# Patient Record
Sex: FEMALE | Race: WHITE | Employment: FULL TIME | URBAN - METROPOLITAN AREA
[De-identification: names, ages, dates, MRNs, and addresses within clinical notes are randomized per-mention and may not be internally consistent; named-entity substitution may affect disease eponyms.]

---

## 2022-02-22 NOTE — PROGRESS NOTES
Gaston Torres    Age 49 y.o.    female    1970    MRN [id number]    3/10/2022  Arrival Time_____________  OR Time____________[address]     Procedure(s):  DILATATION AND CURETTAGE, VIDEO HYSTEROSCOPY,ENDOMETRIAL POLYPECTOMY, NOVASURE ENDOMETRIAL ABLATION                      General     Surgeon(s):  Sharon Fam, MD      DAY ADMIT ___  SDS/OP ___  OUTPT IN BED ___         Phone [phone] ([facility])    PCP _____________________ Phone_________________ Epic ( ) Epic CE ( ) Appt ________    ADDITIONAL INFO __________________________________ Cardio/Consult _____________    NOTES _____________________________________________________________________    ____________________________________________________________________________    PAT APPT DATE:________ TIME: ________  FAXED QAD: _______  (__) H&P w/ hospitalist  ____________________________________________________________________________    COVID TEST: Date/Location______________        NURSING HISTORY COMPLETE: _______  (__) CBC       (__) W/ DIFF ___________  (__)  ECHO    __________  (__) Hgb A1C    ___________  (__) CHEST X RAY   __________  (__) LIPID PROFILE  ___________  (__) EKG   __________  (__) PT/PTT   ___________  (__) PFT's   __________  (__) BMP   ___________  (__) CAROTIDS  __________  (__) CMP   ___________  (__) VEIN MAPPING  __________  (__) U/A   ___________  (__) HISTORY & PHYSICAL __________  (__) URINE C & S  ___________  (__) CARDIAC CLEARANCE __________  (__) U/A W/ FLEX  ___________  (__) PULM.  CLEARANCE __________  (__) SERUM PREGNANCY ___________  (__) Check Epic DOS orders __________  (__) TYPE & SCREEN ________ repeat ( ) (__)  __________________ __________  (__) ALBUMIN   ___________  (__)  __________________ __________  (__) TRANSFERRIN  ___________  (__)  __________________ __________  (__) LIVER PROFILE  ___________  (__)  __________________ __________  (__) CARBOXY HGB  ___________  (__) URINE PREG DOS __________  (__) NICOTINE & MET.  ___________  (__) BLOOD SUGAR DOS __________  (__) PREALBUMIN  ___________    (__) MRSA NASAL SWAB ___________  (__) BLOOD THINNERS __________  (__) ACE/ ARBS: _____________________    (__) BETABLOCKERS ___________________

## 2022-03-02 ENCOUNTER — ANESTHESIA EVENT (OUTPATIENT)
Dept: OPERATING ROOM | Age: 52
End: 2022-03-02
Payer: COMMERCIAL

## 2022-03-04 RX ORDER — IBUPROFEN 200 MG
200 TABLET ORAL EVERY 6 HOURS PRN
Status: ON HOLD | COMMUNITY
End: 2022-03-10 | Stop reason: HOSPADM

## 2022-03-04 NOTE — PROGRESS NOTES
Obstructive Sleep Apnea (SUSSY) Screening     Patient:  Kristine Ochoa    YOB: 1970      Medical Record #:  [id number]                     Date:  3/4/2022     1. Are you a loud and/or regular snorer? []  Yes       [x] No    2. Have you been observed to gasp or stop breathing during sleep? []  Yes       [x] No    3. Do you feel tired or groggy upon awakening or do you awaken with a headache?           []  Yes       [x] No    4. Are you often tired or fatigued during the wake time hours? []  Yes       [x] No    5. Do you fall asleep sitting, reading, watching TV or driving? []  Yes       [x] No    6. Do you often have problems with memory or concentration? []  Yes       [x] No    If patient's SUSSY score if greater than or equal to 3, they are considered high risk for SUSSY. An anesthesia provider will evaluate the patient and develop a plan of care the day of surgery. Note:  If the patient's BMI is more than 35 kg m¯² , has neck circumference > 40 cm, and/or high blood pressure the risk is greater (© American [employer]nea Association, 2006).

## 2022-03-04 NOTE — PROGRESS NOTES
they will be removed, please bring a case for them. 10. If appicable,Please see your family doctor/pediatrician for a history & physical and/or concerning medications. Bring any test results/reports from your physician's office. 11. Remember to bring Blood Bank bracelet to the hospital on the day of surgery. 12. If you have a Living Will and Durable Power of  for Healthcare, please bring in a copy. 15. Notify your Surgeon if you develop any illness between now and surgery  time, cough, cold, fever, sore throat, nausea, vomiting, etc.  Please notify your surgeon if you experience dizziness, shortness of breath or blurred vision between now & the time of your surgery   14. DO NOT shave your operative site 96 hours prior to surgery. For face & neck surgery, men may use an electric razor 48 hours prior to surgery. 15. Shower the night before surgery with ___Antibacterial soap ___Hibiclens   16. To provide excellent care visitors will be limited to one in the room at any given time. 17.  Please bring picture ID and insurance card. 18.  Visit our web site for additional information:  Mobile Messenger. [facility]/surgery.

## 2022-03-10 ENCOUNTER — ANESTHESIA (OUTPATIENT)
Dept: OPERATING ROOM | Age: 52
End: 2022-03-10
Payer: COMMERCIAL

## 2022-03-10 ENCOUNTER — HOSPITAL ENCOUNTER (OUTPATIENT)
Age: 52
Setting detail: OUTPATIENT SURGERY
Discharge: HOME | End: 2022-03-10
Attending: OBSTETRICS & GYNECOLOGY | Admitting: OBSTETRICS & GYNECOLOGY
Payer: COMMERCIAL

## 2022-03-10 VITALS
OXYGEN SATURATION: 100 % | DIASTOLIC BLOOD PRESSURE: 80 MMHG | TEMPERATURE: 95 F | RESPIRATION RATE: 1 BRPM | SYSTOLIC BLOOD PRESSURE: 120 MMHG

## 2022-03-10 VITALS
SYSTOLIC BLOOD PRESSURE: 143 MMHG | RESPIRATION RATE: 14 BRPM | BODY MASS INDEX: 25.4 KG/M2 | HEIGHT: 62 IN | HEART RATE: 77 BPM | OXYGEN SATURATION: 99 % | WEIGHT: 138 LBS | DIASTOLIC BLOOD PRESSURE: 78 MMHG | TEMPERATURE: 96.2 F

## 2022-03-10 DIAGNOSIS — N93.8 DYSFUNCTIONAL UTERINE BLEEDING: ICD-10-CM

## 2022-03-10 DIAGNOSIS — N93.9 ABNORMAL UTERINE BLEEDING: Primary | ICD-10-CM

## 2022-03-10 LAB
ABO/RH: NORMAL
ANTIBODY SCREEN: NORMAL
HCT VFR BLD CALC: 38.3 % (ref 36–48)
HEMOGLOBIN: 12.6 G/DL (ref 12–16)
MCH RBC QN AUTO: 29.4 PG (ref 26–34)
MCHC RBC AUTO-ENTMCNC: 32.9 G/DL (ref 31–36)
MCV RBC AUTO: 89.2 FL (ref 80–100)
PDW BLD-RTO: 13.6 % (ref 12.4–15.4)
PLATELET # BLD: 253 K/UL (ref 135–450)
PMV BLD AUTO: 8.5 FL (ref 5–10.5)
PREGNANCY, URINE: NEGATIVE
RBC # BLD: 4.29 M/UL (ref 4–5.2)
WBC # BLD: 5.1 K/UL (ref 4–11)

## 2022-03-10 PROCEDURE — 7100000001 HC PACU RECOVERY - ADDTL 15 MIN: Performed by: OBSTETRICS & GYNECOLOGY

## 2022-03-10 PROCEDURE — 85027 COMPLETE CBC AUTOMATED: CPT

## 2022-03-10 PROCEDURE — 3600000004 HC SURGERY LEVEL 4 BASE: Performed by: OBSTETRICS & GYNECOLOGY

## 2022-03-10 PROCEDURE — 6360000002 HC RX W HCPCS: Performed by: NURSE ANESTHETIST, CERTIFIED REGISTERED

## 2022-03-10 PROCEDURE — 2709999900 HC NON-CHARGEABLE SUPPLY: Performed by: OBSTETRICS & GYNECOLOGY

## 2022-03-10 PROCEDURE — 36415 COLL VENOUS BLD VENIPUNCTURE: CPT

## 2022-03-10 PROCEDURE — 3700000001 HC ADD 15 MINUTES (ANESTHESIA): Performed by: OBSTETRICS & GYNECOLOGY

## 2022-03-10 PROCEDURE — 3700000000 HC ANESTHESIA ATTENDED CARE: Performed by: OBSTETRICS & GYNECOLOGY

## 2022-03-10 PROCEDURE — 88305 TISSUE EXAM BY PATHOLOGIST: CPT

## 2022-03-10 PROCEDURE — 6360000002 HC RX W HCPCS: Performed by: ANESTHESIOLOGY

## 2022-03-10 PROCEDURE — 86900 BLOOD TYPING SEROLOGIC ABO: CPT

## 2022-03-10 PROCEDURE — 84703 CHORIONIC GONADOTROPIN ASSAY: CPT

## 2022-03-10 PROCEDURE — 7100000010 HC PHASE II RECOVERY - FIRST 15 MIN: Performed by: OBSTETRICS & GYNECOLOGY

## 2022-03-10 PROCEDURE — 2580000003 HC RX 258: Performed by: OBSTETRICS & GYNECOLOGY

## 2022-03-10 PROCEDURE — 3600000014 HC SURGERY LEVEL 4 ADDTL 15MIN: Performed by: OBSTETRICS & GYNECOLOGY

## 2022-03-10 PROCEDURE — 2500000003 HC RX 250 WO HCPCS: Performed by: NURSE ANESTHETIST, CERTIFIED REGISTERED

## 2022-03-10 PROCEDURE — 7100000000 HC PACU RECOVERY - FIRST 15 MIN: Performed by: OBSTETRICS & GYNECOLOGY

## 2022-03-10 PROCEDURE — 86901 BLOOD TYPING SEROLOGIC RH(D): CPT

## 2022-03-10 PROCEDURE — 2580000003 HC RX 258: Performed by: NURSE ANESTHETIST, CERTIFIED REGISTERED

## 2022-03-10 PROCEDURE — 7100000011 HC PHASE II RECOVERY - ADDTL 15 MIN: Performed by: OBSTETRICS & GYNECOLOGY

## 2022-03-10 PROCEDURE — 86850 RBC ANTIBODY SCREEN: CPT

## 2022-03-10 PROCEDURE — 2500000003 HC RX 250 WO HCPCS: Performed by: ANESTHESIOLOGY

## 2022-03-10 PROCEDURE — 6370000000 HC RX 637 (ALT 250 FOR IP): Performed by: ANESTHESIOLOGY

## 2022-03-10 RX ORDER — SODIUM CHLORIDE, SODIUM LACTATE, POTASSIUM CHLORIDE, CALCIUM CHLORIDE 600; 310; 30; 20 MG/100ML; MG/100ML; MG/100ML; MG/100ML
INJECTION, SOLUTION INTRAVENOUS CONTINUOUS PRN
Status: COMPLETED | OUTPATIENT
Start: 2022-03-10 | End: 2022-03-10

## 2022-03-10 RX ORDER — SODIUM CHLORIDE 0.9 % (FLUSH) 0.9 %
5-40 SYRINGE (ML) INJECTION PRN
Status: DISCONTINUED | OUTPATIENT
Start: 2022-03-10 | End: 2022-03-10 | Stop reason: HOSPADM

## 2022-03-10 RX ORDER — LIDOCAINE HYDROCHLORIDE 20 MG/ML
INJECTION, SOLUTION INFILTRATION; PERINEURAL PRN
Status: DISCONTINUED | OUTPATIENT
Start: 2022-03-10 | End: 2022-03-10 | Stop reason: SDUPTHER

## 2022-03-10 RX ORDER — HYDROCODONE BITARTRATE AND ACETAMINOPHEN 5; 325 MG/1; MG/1
1 TABLET ORAL EVERY 4 HOURS PRN
Qty: 4 TABLET | Refills: 0 | Status: SHIPPED | OUTPATIENT
Start: 2022-03-10 | End: 2022-03-13

## 2022-03-10 RX ORDER — IBUPROFEN 600 MG/1
600 TABLET ORAL EVERY 6 HOURS PRN
Qty: 25 TABLET | Refills: 0 | Status: SHIPPED | OUTPATIENT
Start: 2022-03-10

## 2022-03-10 RX ORDER — SODIUM CHLORIDE 0.9 % (FLUSH) 0.9 %
5-40 SYRINGE (ML) INJECTION EVERY 12 HOURS SCHEDULED
Status: DISCONTINUED | OUTPATIENT
Start: 2022-03-10 | End: 2022-03-10 | Stop reason: HOSPADM

## 2022-03-10 RX ORDER — OXYCODONE HYDROCHLORIDE AND ACETAMINOPHEN 5; 325 MG/1; MG/1
2 TABLET ORAL EVERY 4 HOURS PRN
Status: DISCONTINUED | OUTPATIENT
Start: 2022-03-10 | End: 2022-03-10 | Stop reason: HOSPADM

## 2022-03-10 RX ORDER — SODIUM CHLORIDE 0.9 % (FLUSH) 0.9 %
10 SYRINGE (ML) INJECTION PRN
Status: DISCONTINUED | OUTPATIENT
Start: 2022-03-10 | End: 2022-03-10 | Stop reason: HOSPADM

## 2022-03-10 RX ORDER — PROPOFOL 10 MG/ML
INJECTION, EMULSION INTRAVENOUS PRN
Status: DISCONTINUED | OUTPATIENT
Start: 2022-03-10 | End: 2022-03-10 | Stop reason: SDUPTHER

## 2022-03-10 RX ORDER — SODIUM CHLORIDE 9 MG/ML
25 INJECTION, SOLUTION INTRAVENOUS PRN
Status: DISCONTINUED | OUTPATIENT
Start: 2022-03-10 | End: 2022-03-10 | Stop reason: HOSPADM

## 2022-03-10 RX ORDER — ONDANSETRON 2 MG/ML
4 INJECTION INTRAMUSCULAR; INTRAVENOUS
Status: COMPLETED | OUTPATIENT
Start: 2022-03-10 | End: 2022-03-10

## 2022-03-10 RX ORDER — SODIUM CHLORIDE, SODIUM LACTATE, POTASSIUM CHLORIDE, CALCIUM CHLORIDE 600; 310; 30; 20 MG/100ML; MG/100ML; MG/100ML; MG/100ML
INJECTION, SOLUTION INTRAVENOUS CONTINUOUS PRN
Status: DISCONTINUED | OUTPATIENT
Start: 2022-03-10 | End: 2022-03-10 | Stop reason: SDUPTHER

## 2022-03-10 RX ORDER — FENTANYL CITRATE 50 UG/ML
INJECTION, SOLUTION INTRAMUSCULAR; INTRAVENOUS PRN
Status: DISCONTINUED | OUTPATIENT
Start: 2022-03-10 | End: 2022-03-10 | Stop reason: SDUPTHER

## 2022-03-10 RX ORDER — MEPERIDINE HYDROCHLORIDE 50 MG/ML
12.5 INJECTION INTRAMUSCULAR; INTRAVENOUS; SUBCUTANEOUS EVERY 5 MIN PRN
Status: DISCONTINUED | OUTPATIENT
Start: 2022-03-10 | End: 2022-03-10 | Stop reason: HOSPADM

## 2022-03-10 RX ORDER — SODIUM CHLORIDE, SODIUM LACTATE, POTASSIUM CHLORIDE, CALCIUM CHLORIDE 600; 310; 30; 20 MG/100ML; MG/100ML; MG/100ML; MG/100ML
INJECTION, SOLUTION INTRAVENOUS CONTINUOUS
Status: DISCONTINUED | OUTPATIENT
Start: 2022-03-10 | End: 2022-03-10 | Stop reason: HOSPADM

## 2022-03-10 RX ORDER — ONDANSETRON 2 MG/ML
INJECTION INTRAMUSCULAR; INTRAVENOUS PRN
Status: DISCONTINUED | OUTPATIENT
Start: 2022-03-10 | End: 2022-03-10 | Stop reason: SDUPTHER

## 2022-03-10 RX ORDER — SODIUM CHLORIDE 0.9 % (FLUSH) 0.9 %
10 SYRINGE (ML) INJECTION EVERY 12 HOURS SCHEDULED
Status: DISCONTINUED | OUTPATIENT
Start: 2022-03-10 | End: 2022-03-10 | Stop reason: HOSPADM

## 2022-03-10 RX ORDER — KETOROLAC TROMETHAMINE 30 MG/ML
INJECTION, SOLUTION INTRAMUSCULAR; INTRAVENOUS PRN
Status: DISCONTINUED | OUTPATIENT
Start: 2022-03-10 | End: 2022-03-10 | Stop reason: SDUPTHER

## 2022-03-10 RX ADMIN — KETOROLAC TROMETHAMINE 30 MG: 30 INJECTION, SOLUTION INTRAMUSCULAR; INTRAVENOUS at 08:10

## 2022-03-10 RX ADMIN — FAMOTIDINE 20 MG: 10 INJECTION, SOLUTION INTRAVENOUS at 07:41

## 2022-03-10 RX ADMIN — FENTANYL CITRATE 100 MCG: 50 INJECTION INTRAMUSCULAR; INTRAVENOUS at 07:40

## 2022-03-10 RX ADMIN — PROPOFOL 160 MG: 10 INJECTION, EMULSION INTRAVENOUS at 07:43

## 2022-03-10 RX ADMIN — ONDANSETRON 4 MG: 2 INJECTION INTRAMUSCULAR; INTRAVENOUS at 07:39

## 2022-03-10 RX ADMIN — LIDOCAINE HYDROCHLORIDE 100 MG: 20 INJECTION, SOLUTION INFILTRATION; PERINEURAL at 07:40

## 2022-03-10 RX ADMIN — ONDANSETRON 4 MG: 2 INJECTION INTRAMUSCULAR; INTRAVENOUS at 09:46

## 2022-03-10 RX ADMIN — SODIUM CHLORIDE, SODIUM LACTATE, POTASSIUM CHLORIDE, AND CALCIUM CHLORIDE: .6; .31; .03; .02 INJECTION, SOLUTION INTRAVENOUS at 07:38

## 2022-03-10 RX ADMIN — OXYCODONE HYDROCHLORIDE AND ACETAMINOPHEN 2 TABLET: 5; 325 TABLET ORAL at 09:26

## 2022-03-10 ASSESSMENT — PULMONARY FUNCTION TESTS
PIF_VALUE: 1
PIF_VALUE: 2
PIF_VALUE: 1
PIF_VALUE: 2
PIF_VALUE: 0
PIF_VALUE: 2
PIF_VALUE: 1
PIF_VALUE: 2
PIF_VALUE: 0
PIF_VALUE: 2
PIF_VALUE: 3
PIF_VALUE: 20
PIF_VALUE: 2
PIF_VALUE: 3
PIF_VALUE: 2
PIF_VALUE: 3
PIF_VALUE: 9
PIF_VALUE: 2
PIF_VALUE: 0
PIF_VALUE: 3
PIF_VALUE: 2
PIF_VALUE: 2
PIF_VALUE: 3
PIF_VALUE: 10
PIF_VALUE: 2
PIF_VALUE: 3
PIF_VALUE: 2
PIF_VALUE: 22
PIF_VALUE: 1
PIF_VALUE: 2
PIF_VALUE: 2

## 2022-03-10 ASSESSMENT — LIFESTYLE VARIABLES: SMOKING_STATUS: 0

## 2022-03-10 ASSESSMENT — ENCOUNTER SYMPTOMS: SHORTNESS OF BREATH: 0

## 2022-03-10 ASSESSMENT — PAIN SCALES - GENERAL
PAINLEVEL_OUTOF10: 0
PAINLEVEL_OUTOF10: 9
PAINLEVEL_OUTOF10: 0

## 2022-03-10 NOTE — H&P
I have reviewed the history and physical and examined the patient and find no relevant changes. I have reviewed with the patient and/or family the risks, benefits, and alternatives to the procedure.     Raul Shearer

## 2022-03-10 NOTE — OP NOTE
[address], [place] [address]                                OPERATIVE REPORT    PATIENT NAME: Sabrina Ceron                     :        1970  MED REC NO:   [id number]                          ROOM:  ACCOUNT NO:   [de-identified]                           ADMIT DATE: 03/10/2022  PROVIDER:     Gallo Beaver MD    DATE OF PROCEDURE:  03/10/2022    PREOPERATIVE DIAGNOSES:  Dysfunctional uterine bleeding, endometrial  thickening. POSTOPERATIVE DIAGNOSES:  Dysfunctional uterine bleeding, endometrial  thickening. OPERATION PERFORMED:  Diagnostic hysteroscopy, D and C, Novasure  endometrial ablation. SURGEON:  Gallo Beaver MD    ANESTHESIA:  General.    ESTIMATED BLOOD LOSS:  Less than 50 mL. SPECIMEN:  Endometrial curettings. INDICATIONS AND CONSENT:  A 81-year-old  3, para 3 who presents  for evaluation and management of abnormal uterine bleeding and a  suspected intrauterine polyp versus myoma. Procedure was reviewed, side  effects, benefits and risks. Consent was obtained. All questions were  answered. OPERATIVE PROCEDURE:  The patient was taken to the operating room on  03/10, placed on the OR table in supine position. General anesthetic  was administered. She was placed in the dorsal lithotomy position. Exam under anesthesia revealed an anteverted uterus, upper limits of  normal size. No adnexal masses were appreciated. The patient was  prepped and draped in a sterile fashion. Her bladder was emptied in a  sterile fashion. Weighted speculum was placed into the posterior vagina. Single-tooth  tenaculum was used to grasp the cervix. Cervix was gently dilated to  admit a diagnostic hysteroscope. Hysteroscopy was performed. Endocervix was unremarkable. Endometrium had lush endometrium, but  otherwise unremarkable.   No obvious endometrial polyp or fibroid,  including submucosal fibroid. Both tubal ostia were clearly seen. Hysteroscope was removed. Curettage was performed. Uterus was sounded  to 9 cm. Novasure measuring device was used noting an endometrial  length of 5.5. Novasure device was brought upon the field. Novasure device was placed into the endometrial cavity and engaged. Cavity length was 5.5 and cavity width was 4.5 for a power of 139 sarkar. Ablation was started and continued uninterrupted for 1 minute 35  seconds. Device was disengaged and removed. Hysteroscopy was  re-performed noting an excellent endometrial ablation:  The patient  received Toradol per Anesthesia. Single-tooth tenaculum and weighted  speculum were removed. All sponges were accounted for. The patient was  taken out of dorsal lithotomy position that she had been placed in. The  patient was awakened in the OR, taken to recovery room in good  condition. Final sponge count reported as correct. Specimens  endometrial curettings.         Jose Luis Robert MD    D: 03/10/2022 8:18:56       T: 03/10/2022 8:22:13     [email or web address]  Job#: [id number]     Doc#: [id number]    CC:

## 2022-03-10 NOTE — ANESTHESIA PRE PROCEDURE
Department of Anesthesiology  Preprocedure Note       Name:  Walter Aguiar   Age:  49 y.o.  :  1970                                          MRN:  [id number]         Date:  3/10/2022      Surgeon: Hitesh Ragland):  Adeline Hartmann MD    Procedure: Procedure(s):  DILATATION AND CURETTAGE, VIDEO HYSTEROSCOPY,ENDOMETRIAL POLYPECTOMY, NOVASURE ENDOMETRIAL ABLATION    Medications prior to admission:   Prior to Admission medications    Medication Sig Start Date End Date Taking? Authorizing Provider   ibuprofen (ADVIL;MOTRIN) 200 MG tablet Take 200 mg by mouth every 6 hours as needed for Pain   Yes Historical Provider, MD   Omega-3 Fatty Acids (FISH OIL PO) Take 2 capsules by mouth daily   Yes Historical Provider, MD   COLLAGEN PO Take 2 capsules by mouth daily   Yes Historical Provider, MD       Current medications:    Current Facility-Administered Medications   Medication Dose Route Frequency Provider Last Rate Last Admin    lactated ringers infusion   IntraVENous Continuous Bibi Cardenas MD        sodium chloride flush 0.9 % injection 10 mL  10 mL IntraVENous 2 times per day Bibi Cardenas MD        sodium chloride flush 0.9 % injection 10 mL  10 mL IntraVENous PRN Bibi Cardenas MD        0.9 % sodium chloride infusion  25 mL IntraVENous PRN Bibi Cardenas MD        famotidine (PEPCID) injection 20 mg  20 mg IntraVENous Once Bibi Cardenas MD           Allergies:  No Known Allergies    Problem List:  There is no problem list on file for this patient. Past Medical History:  History reviewed. No pertinent past medical history.     Past Surgical History:        Procedure Laterality Date     SECTION      x3    CHOLECYSTECTOMY         Social History:    Social History     Tobacco Use    Smoking status: Never Smoker    Smokeless tobacco: Never Used   Substance Use Topics    Alcohol use: Never                                Counseling given: Not Answered      Vital Signs (Current):   Vitals:    03/04/22 1418 03/10/22 0656   BP:  123/82   Pulse:  89   Resp:  16   Temp:  97.7 °F (36.5 °C)   TempSrc:  Temporal   SpO2:  100%   Weight: 139 lb (63 kg) 138 lb (62.6 kg)   Height: 5' 2\" (1.575 m) 5' 2\" (1.575 m)                                              BP Readings from Last 3 Encounters:   03/10/22 123/82       NPO Status: Time of last liquid consumption: 2300                        Time of last solid consumption: 2030                        Date of last liquid consumption: 03/09/22                        Date of last solid food consumption: 03/09/22    BMI:   Wt Readings from Last 3 Encounters:   03/10/22 138 lb (62.6 kg)     Body mass index is 25.24 kg/m². CBC: No results found for: WBC, RBC, HGB, HCT, MCV, RDW, PLT    CMP: No results found for: NA, K, CL, CO2, BUN, CREATININE, GFRAA, AGRATIO, LABGLOM, GLUCOSE, GLU, PROT, CALCIUM, BILITOT, ALKPHOS, AST, ALT    POC Tests: No results for input(s): POCGLU, POCNA, POCK, POCCL, POCBUN, POCHEMO, POCHCT in the last 72 hours. Coags: No results found for: PROTIME, INR, APTT    HCG (If Applicable):   Lab Results   Component Value Date    PREGTESTUR Negative 03/10/2022        ABGs: No results found for: PHART, PO2ART, STW9EJQ, IIW1AWD, BEART, A1CRVVXU     Type & Screen (If Applicable):  No results found for: LABABO, LABRH    Drug/Infectious Status (If Applicable):  No results found for: HIV, HEPCAB    COVID-19 Screening (If Applicable): No results found for: COVID19        Anesthesia Evaluation  Patient summary reviewed history of anesthetic complications (\" LOW BP AFTER SPINAL FOR C SECTION \"):    Airway: Mallampati: II  TM distance: >3 FB   Neck ROM: limited  Mouth opening: > = 3 FB Dental:          Pulmonary:Negative Pulmonary ROS breath sounds clear to auscultation      (-) COPD, asthma, shortness of breath, recent URI, sleep apnea and not a current smoker          Patient did not smoke on day of surgery. Cardiovascular:Negative CV ROS        (-) pacemaker, hypertension, past MI, CAD, CABG/stent, dysrhythmias,  angina and  CHF    ECG reviewed  Rhythm: regular  Rate: normal           Beta Blocker:  Not on Beta Blocker         Neuro/Psych:   Negative Neuro/Psych ROS     (-) seizures, TIA and CVA           GI/Hepatic/Renal: Neg GI/Hepatic/Renal ROS       (-) GERD, liver disease, no renal disease, bowel prep and no morbid obesity       Endo/Other:    (+) no malignancy/cancer. (-) diabetes mellitus, hypothyroidism, hyperthyroidism, blood dyscrasia, no malignancy/cancer               Abdominal:             Vascular: negative vascular ROS. Other Findings:             Anesthesia Plan      general     ASA 2       Induction: intravenous. MIPS: Postoperative opioids intended and Prophylactic antiemetics administered. Anesthetic plan and risks discussed with patient. This pre-anesthesia assessment may be used as a history and physical.    DOS STAFF ADDENDUM:    Pt seen and examined, chart reviewed (including anesthesia, drug and allergy history). No interval changes to history and physical examination. Anesthetic plan, risks, benefits, alternatives, and personnel involved discussed with patient. Patient verbalized an understanding and agrees to proceed.       Miguelangel Johnson MD  March 10, 2022  7:10 AM      Miguelangel Johnson MD   3/10/2022 2019

## 2022-03-10 NOTE — BRIEF OP NOTE
Brief Postoperative Note      Patient: Jessica Castaneda  YOB: 1970  MRN: [id number]    Date of Procedure: 3/10/2022    Pre-Op Diagnosis: DYSFUNCTIONAL UTERINE BLEEDING    Post-Op Diagnosis: Same       Procedure(s):  DILATATION AND CURETTAGE, VIDEO HYSTEROSCOPY,  NOVASURE ENDOMETRIAL ABLATION    Surgeon(s):  Diallo Johnson MD    Assistant:  * No surgical staff found *    Anesthesia: General    Estimated Blood Loss (mL): less than 50     Complications: None    Specimens:   ID Type Source Tests Collected by Time Destination   A : ENDOMETRIAL CURRETTINGS Tissue Endometrium SURGICAL PATHOLOGY Diallo Johnson MD 3/10/2022 8027        Implants:  * No implants in log *      Drains: * No LDAs found *    Findings: nl endometrial cavity    Electronically signed by David Carmona MD on 3/10/2022 at 8:15 AM

## 2022-03-11 NOTE — ANESTHESIA POSTPROCEDURE EVALUATION
Department of Anesthesiology  Postprocedure Note    Patient: Brenda Torres  MRN: [id number]  YOB: 1970  Date of evaluation: 3/10/2022  Time:  8:18 PM     Procedure Summary     Date: 03/10/22 Room / Location: [facility] [address]  / [facility]    Anesthesia Start: 7393 Anesthesia Stop: 0825    Procedure: DILATATION AND CURETTAGE, VIDEO HYSTEROSCOPY,  NOVASURE ENDOMETRIAL ABLATION (N/A Vagina ) Diagnosis:       Dysfunctional uterine bleeding      (DYSFUNCTIONAL UTERINE BLEEDING)    Surgeons: Philip Lai MD Responsible Provider: Eric Harrell MD    Anesthesia Type: general ASA Status: 2          Anesthesia Type: general    Marli Phase I: Marli Score: 10    Marli Phase II: Marli Score: 10    Last vitals: Reviewed and per EMR flowsheets.        Anesthesia Post Evaluation    Comments: Postoperative Anesthesia Note    Name:    Brenda Torres  MRN:      [id number]    Patient Vitals in the past 12 hrs:  03/10/22 0845, BP:(!) 143/78, Pulse:77, Resp:14, SpO2:99 %  03/10/22 0840, BP:(!) 139/90, Pulse:79, Resp:18, SpO2:99 %  03/10/22 0835, BP:132/76, Pulse:67, Resp:16, SpO2:100 %  03/10/22 0830, BP:135/74, Pulse:68, Resp:17, SpO2:100 %  03/10/22 0828, BP:(!) 126/98, Temp:96.2 °F (35.7 °C), Temp src:Tympanic, Pulse:73, Resp:14, SpO2:100 %  03/10/22 0825, BP:(!) 126/98, Pulse:77, Resp:10, SpO2:99 %     LABS:    CBC  Lab Results       Component                Value               Date/Time                  WBC                      5.1                 03/10/2022 07:01 AM        HGB                      12.6                03/10/2022 07:01 AM        HCT                      38.3                03/10/2022 07:01 AM        PLT                      253                 03/10/2022 07:01 AM   RENAL  No results found for: NA, K, CL, CO2, BUN, CREATININE, GLUCOSE  COAGS  No results found for: PROTIME, INR, APTT    Intake & Output:  @66WSMX@    Nausea & Vomiting:  No    Level of Consciousness: Awake    Pain Assessment:  Adequate analgesia    Anesthesia Complications:  No apparent anesthetic complications    SUMMARY      Vital signs stable  OK to discharge from Stage I post anesthesia care.   Care transferred from Anesthesiology department on discharge from perioperative area

## 2024-06-11 ENCOUNTER — OFFICE VISIT (OUTPATIENT)
Dept: FAMILY MEDICINE CLINIC | Age: 54
End: 2024-06-11
Payer: COMMERCIAL

## 2024-06-11 VITALS
WEIGHT: 173.2 LBS | OXYGEN SATURATION: 99 % | HEART RATE: 88 BPM | SYSTOLIC BLOOD PRESSURE: 118 MMHG | BODY MASS INDEX: 31.87 KG/M2 | HEIGHT: 62 IN | DIASTOLIC BLOOD PRESSURE: 82 MMHG

## 2024-06-11 DIAGNOSIS — G43.709 CHRONIC MIGRAINE W/O AURA W/O STATUS MIGRAINOSUS, NOT INTRACTABLE: ICD-10-CM

## 2024-06-11 DIAGNOSIS — Z12.31 SCREENING MAMMOGRAM FOR BREAST CANCER: ICD-10-CM

## 2024-06-11 DIAGNOSIS — Z76.89 ENCOUNTER TO ESTABLISH CARE: Primary | ICD-10-CM

## 2024-06-11 PROBLEM — Z98.890 HISTORY OF ENDOMETRIAL ABLATION: Status: ACTIVE | Noted: 2022-10-05

## 2024-06-11 PROCEDURE — 1036F TOBACCO NON-USER: CPT | Performed by: SURGERY

## 2024-06-11 PROCEDURE — G8427 DOCREV CUR MEDS BY ELIG CLIN: HCPCS | Performed by: SURGERY

## 2024-06-11 PROCEDURE — 3017F COLORECTAL CA SCREEN DOC REV: CPT | Performed by: SURGERY

## 2024-06-11 PROCEDURE — G8417 CALC BMI ABV UP PARAM F/U: HCPCS | Performed by: SURGERY

## 2024-06-11 PROCEDURE — G2211 COMPLEX E/M VISIT ADD ON: HCPCS | Performed by: SURGERY

## 2024-06-11 PROCEDURE — 99203 OFFICE O/P NEW LOW 30 MIN: CPT | Performed by: SURGERY

## 2024-06-11 SDOH — ECONOMIC STABILITY: FOOD INSECURITY: WITHIN THE PAST 12 MONTHS, THE FOOD YOU BOUGHT JUST DIDN'T LAST AND YOU DIDN'T HAVE MONEY TO GET MORE.: NEVER TRUE

## 2024-06-11 SDOH — ECONOMIC STABILITY: INCOME INSECURITY: HOW HARD IS IT FOR YOU TO PAY FOR THE VERY BASICS LIKE FOOD, HOUSING, MEDICAL CARE, AND HEATING?: NOT HARD AT ALL

## 2024-06-11 SDOH — ECONOMIC STABILITY: FOOD INSECURITY: WITHIN THE PAST 12 MONTHS, YOU WORRIED THAT YOUR FOOD WOULD RUN OUT BEFORE YOU GOT MONEY TO BUY MORE.: NEVER TRUE

## 2024-06-11 SDOH — ECONOMIC STABILITY: HOUSING INSECURITY
IN THE LAST 12 MONTHS, WAS THERE A TIME WHEN YOU DID NOT HAVE A STEADY PLACE TO SLEEP OR SLEPT IN A SHELTER (INCLUDING NOW)?: NO

## 2024-06-11 ASSESSMENT — PATIENT HEALTH QUESTIONNAIRE - PHQ9
1. LITTLE INTEREST OR PLEASURE IN DOING THINGS: NOT AT ALL
SUM OF ALL RESPONSES TO PHQ QUESTIONS 1-9: 0
SUM OF ALL RESPONSES TO PHQ9 QUESTIONS 1 & 2: 0
SUM OF ALL RESPONSES TO PHQ QUESTIONS 1-9: 0

## 2024-06-11 NOTE — PROGRESS NOTES
2024    This is a 53 y.o. female   Chief Complaint   Patient presents with    New Patient     Head aches, she has had testing for this blurry vision. She has been on different meds for this. Urine smells strong after having the A ablation done   .    Long standing hx of migraines, frequent. Imitrex helped for abortive but still having frequent episodes. Amitriptyline tried made her very groggy throughout the next day, not tolerated. Topiramate tried, did not help reduce frequency of episodes. Reasonable to try Qulipta for prevention. Will give sample today to see if benefit.          Past Medical History:   Diagnosis Date    Migraine headache        Past Surgical History:   Procedure Laterality Date     SECTION      x3    CHOLECYSTECTOMY      DILATION AND CURETTAGE OF UTERUS N/A 3/10/2022    DILATATION AND CURETTAGE, VIDEO HYSTEROSCOPY,  NOVASURE ENDOMETRIAL ABLATION performed by Seun Sanders MD at [facility] OR       Social History     Socioeconomic History    Marital status:      Spouse name: Not on file    Number of children: Not on file    Years of education: Not on file    Highest education level: Not on file   Occupational History    Not on file   Tobacco Use    Smoking status: Never     Passive exposure: Never    Smokeless tobacco: Never   Vaping Use    Vaping Use: Never used   Substance and Sexual Activity    Alcohol use: Never    Drug use: Never    Sexual activity: Not on file   Other Topics Concern    Not on file   Social History Narrative    Not on file     Social Determinants of Health     Financial Resource Strain: Low Risk  (2024)    Overall Financial Resource Strain (CARDIA)     Difficulty of Paying Living Expenses: Not hard at all   Food Insecurity: No Food Insecurity (2024)    Hunger Vital Sign     Worried About Running Out of Food in the Last Year: Never true     Ran Out of Food in the Last Year: Never true   Transportation Needs: Unknown (2024)    PRAPARE -

## 2024-07-29 ASSESSMENT — ENCOUNTER SYMPTOMS
CONSTIPATION: 0
SORE THROAT: 0
COUGH: 0
NAUSEA: 0
ABDOMINAL PAIN: 0
DIARRHEA: 0
SHORTNESS OF BREATH: 0

## 2024-09-05 ENCOUNTER — TELEMEDICINE (OUTPATIENT)
Age: 54
End: 2024-09-05
Payer: COMMERCIAL

## 2024-09-05 DIAGNOSIS — R05.1 ACUTE COUGH: ICD-10-CM

## 2024-09-05 DIAGNOSIS — R06.2 WHEEZING: ICD-10-CM

## 2024-09-05 DIAGNOSIS — R50.9 LOW GRADE FEVER: Primary | ICD-10-CM

## 2024-09-05 DIAGNOSIS — R09.89 CHEST CONGESTION: ICD-10-CM

## 2024-09-05 PROCEDURE — G8427 DOCREV CUR MEDS BY ELIG CLIN: HCPCS | Performed by: NURSE PRACTITIONER

## 2024-09-05 PROCEDURE — 3017F COLORECTAL CA SCREEN DOC REV: CPT | Performed by: NURSE PRACTITIONER

## 2024-09-05 PROCEDURE — G8417 CALC BMI ABV UP PARAM F/U: HCPCS | Performed by: NURSE PRACTITIONER

## 2024-09-05 PROCEDURE — 1036F TOBACCO NON-USER: CPT | Performed by: NURSE PRACTITIONER

## 2024-09-05 PROCEDURE — 99213 OFFICE O/P EST LOW 20 MIN: CPT | Performed by: NURSE PRACTITIONER

## 2024-09-05 RX ORDER — BENZONATATE 200 MG/1
200 CAPSULE ORAL 3 TIMES DAILY PRN
Qty: 30 CAPSULE | Refills: 0 | Status: SHIPPED | OUTPATIENT
Start: 2024-09-05 | End: 2024-09-15

## 2024-09-05 RX ORDER — PREDNISONE 20 MG/1
20 TABLET ORAL 2 TIMES DAILY
Qty: 10 TABLET | Refills: 0 | Status: SHIPPED | OUTPATIENT
Start: 2024-09-05 | End: 2024-09-10

## 2024-09-05 RX ORDER — DEXTROMETHORPHAN HYDROBROMIDE AND PROMETHAZINE HYDROCHLORIDE 15; 6.25 MG/5ML; MG/5ML
5 SYRUP ORAL 4 TIMES DAILY PRN
Qty: 118 ML | Refills: 0 | Status: SHIPPED | OUTPATIENT
Start: 2024-09-05 | End: 2024-09-12

## 2024-09-05 ASSESSMENT — ENCOUNTER SYMPTOMS
WHEEZING: 1
COUGH: 1
SHORTNESS OF BREATH: 0

## 2024-09-05 NOTE — PROGRESS NOTES
Abbi Sepulveda, was evaluated through a synchronous (real-time) audio-video encounter. The patient (or guardian if applicable) is aware that this is a billable service, which includes applicable co-pays. This Virtual Visit was conducted with patient's (and/or legal guardian's) consent. Patient identification was verified, and a caregiver was present when appropriate.   The patient was located at Home: [address] [address]  Provider was located at Home (Appt Dept State): [place]  Confirm you are appropriately licensed, registered, or certified to deliver care in the state where the patient is located as indicated above. If you are not or unsure, please re-schedule the visit: Yes, I confirm.     Abbi Sepulveda (:  1970) is a Established patient, presenting virtually for evaluation of the following:    Started Aug 14, symptoms have improved some but now feels like it settled in her chest     Below is the assessment and plan developed based on review of pertinent history, physical exam, labs, studies, and medications.    Assessment & Plan  Low grade fever    Problem    Orders:    CBC with Auto Differential; Future-We will send my chart message of results or call the patient  Continue taking over the counter ibuprofen as the bottle directs  See patient instructions    A fever is a high body temperature. It's one way your body fights illness. A temperature of up to 102°F can be helpful, because it helps the body respond to infection. Most healthy people can have a fever as high as 103°F to 104°F for short periods of time without problems. In most cases, a fever means that you have a minor illness.  Follow-up care is a key part of your treatment and safety. Be sure to make and go to all appointments, and call your doctor if you are having problems. It's also a good idea to know your test results and keep a list of the medicines you take.  How can you care for yourself at home?  Drink plenty of fluids

## (undated) DEVICE — Z DISCONTINUED USE 2272117 DRAPE SURG 3 QTR N INVASIVE 2 LAYR DISP

## (undated) DEVICE — KIT NOVASURE V5 THERMOABLATION DEVICE

## (undated) DEVICE — DRAPE,UNDERBUTTOCKS,PCH,STERILE: Brand: MEDLINE

## (undated) DEVICE — Z CONVERTED USE 2271043 CONTAINER SPEC COLL 4OZ SCR ON LID PEEL PCH

## (undated) DEVICE — MARKER,SKIN,WI/RULER AND LABELS: Brand: MEDLINE

## (undated) DEVICE — COVER LT HNDL PLAS RIG 2 PER PK

## (undated) DEVICE — D&C: Brand: MEDLINE INDUSTRIES, INC.

## (undated) DEVICE — Y-TYPE TUR/BLADDER IRRIGATION SET, REGULATING CLAMP

## (undated) DEVICE — GLOVE ORANGE PI 7 1/2   MSG9075